# Patient Record
Sex: MALE | Race: WHITE
[De-identification: names, ages, dates, MRNs, and addresses within clinical notes are randomized per-mention and may not be internally consistent; named-entity substitution may affect disease eponyms.]

---

## 2022-11-03 LAB
BUN BLD-MCNC: 16 MG/DL (ref 7–18)
GLUCOSE SERPLBLD-MCNC: 122 MG/DL (ref 74–106)
HCT VFR BLD CALC: 45.6 % (ref 39.6–49)
INR BLD: 1.12
LYMPHOCYTES # SPEC AUTO: 1.4 K/UL (ref 0.7–4.9)
MCV RBC: 88.8 FL (ref 80–100)
PMV BLD: 7.3 FL (ref 7.6–11.3)
POTASSIUM SERPL-SCNC: 3.4 MMOL/L (ref 3.5–5.1)
RBC # BLD: 5.14 M/UL (ref 4.33–5.43)

## 2022-11-07 NOTE — EKG
Test Date:    2022-11-03               Test Time:    14:52:17

Technician:   NOEL                                     

                                                     

MEASUREMENT RESULTS:                                       

Intervals:                                           

Rate:         78                                     

MN:           142                                    

QRSD:         118                                    

QT:           358                                    

QTc:          408                                    

Axis:                                                

P:            70                                     

MN:           142                                    

QRS:          63                                     

T:            55                                     

                                                     

INTERPRETIVE STATEMENTS:                                       

                                                     

Normal sinus rhythm

Right bundle branch block

Abnormal ECG

No previous ECG available for comparison



Electronically Signed On 11-07-22 12:14:06 CST by Shorty Merida

## 2022-11-08 ENCOUNTER — HOSPITAL ENCOUNTER (OUTPATIENT)
Dept: HOSPITAL 97 - OR | Age: 57
Discharge: HOME | End: 2022-11-08
Attending: UROLOGY
Payer: SELF-PAY

## 2022-11-08 VITALS — DIASTOLIC BLOOD PRESSURE: 84 MMHG | SYSTOLIC BLOOD PRESSURE: 131 MMHG | OXYGEN SATURATION: 99 % | TEMPERATURE: 97 F

## 2022-11-08 DIAGNOSIS — E78.00: ICD-10-CM

## 2022-11-08 DIAGNOSIS — Z88.2: ICD-10-CM

## 2022-11-08 DIAGNOSIS — K21.9: ICD-10-CM

## 2022-11-08 DIAGNOSIS — Z88.1: ICD-10-CM

## 2022-11-08 DIAGNOSIS — N40.1: Primary | ICD-10-CM

## 2022-11-08 PROCEDURE — 80048 BASIC METABOLIC PNL TOTAL CA: CPT

## 2022-11-08 PROCEDURE — 93005 ELECTROCARDIOGRAM TRACING: CPT

## 2022-11-08 PROCEDURE — 0T7D8DZ DILATION OF URETHRA WITH INTRALUMINAL DEVICE, VIA NATURAL OR ARTIFICIAL OPENING ENDOSCOPIC: ICD-10-PCS

## 2022-11-08 PROCEDURE — 36415 COLL VENOUS BLD VENIPUNCTURE: CPT

## 2022-11-08 PROCEDURE — 87088 URINE BACTERIA CULTURE: CPT

## 2022-11-08 PROCEDURE — 85610 PROTHROMBIN TIME: CPT

## 2022-11-08 PROCEDURE — 87086 URINE CULTURE/COLONY COUNT: CPT

## 2022-11-08 PROCEDURE — 85025 COMPLETE CBC W/AUTO DIFF WBC: CPT

## 2022-11-08 RX ADMIN — HYDROMORPHONE HYDROCHLORIDE ONE MG: 1 INJECTION, SOLUTION INTRAMUSCULAR; INTRAVENOUS; SUBCUTANEOUS at 14:41

## 2022-11-08 RX ADMIN — HYDROMORPHONE HYDROCHLORIDE ONE MG: 1 INJECTION, SOLUTION INTRAMUSCULAR; INTRAVENOUS; SUBCUTANEOUS at 14:50

## 2022-11-08 NOTE — OP
Surgeon:  ALEXUS LAZAR



Preoperative Diagnosis:  Benign prostatic hypertrophy with lower urinary tract obstruction.



Postoperative Diagnosis:  Benign prostatic hypertrophy with lower urinary tract obstruction.



Principal Procedure:  

1.Prostatic urethral lift/UroLift.

2.Six implants placed with the UL2 device.

3.One implant placed with the ATC/advanced tissue control device.

4.Seven implants total implanted.



Indication For Procedure:  Mr. Soria [facility] with obstructive urinary symptom
s secondary to BPH.  Despite maximal medical management with alpha-blocker therapy, he still had pers
istent moderate LUTS.  After evaluation revealed the presence of an elevated median bar with median l
obe extending from the right bladder neck with association with the right lateral lobe, surgical ther
apy was discussed and the UroLift was elected.



Procedure In Detail:  The patient was consented in the preoperative holding area before being transfe
rred to the operative suite where general anesthesia was induced.  He was given Ancef 2 g IV antimicr
obial prophylaxis and pneumo boots were provided for DVT prophylaxis.  He was placed in the lithotomy
 position, padded and secured to the table appropriately and his genitalia were prepped with Hibiclen
s before he was draped in standard fashion.  The case was begun using a the 20-Wolof UroLift scope t
o traverse the urethra.  However, before this could be performed, because of significant meatal steno
sis, dilation of the meatus and fossa navicularis was performed.  Using urethral sounds beginning at 
18-Wolof, dense meatal stenosis was dilated all the way to 28-Wolof successfully.  I was then able 
to pass the cystoscope via the urethra into his bladder.  As had been previously observed, there was 
significant lateral lobar hypertrophy with a prostatic urethral length approximately 4-5 cm and an in
travesical component of the median lobe extending from the right lateral lobe of the prostate.  I thu
s began by targeting the left lateral lobe anteriorly and using a UroLift delivery device with the UL
2 device and anterolateral position approximately 1.5-2 cm distal to the bladder neck was identified.
  Approximately 10 degrees of compression was performed in that region and the trigger was pulled, th
ereby deploying a needle containing the implant through the prostate.  Further retraction of the tiss
ue was performed and the second pull was performed allowing 1 end of the implant to be delivered to t
he capsular surface of the prostate.  Additional tensioning was then performed before the third pull 
was performed further tensioning the tissue and then the scope was angled back toward the midline unt
il the monofilament was noted to be seated within the delivery bay before the fourth pole of the trig
matteo cut the monofilament affixing the endpiece, which then nicely compressed the lateral lobe in that
 position.  I then turned my attention to second implant, which was placed on the contralateral side 
of the right lateral lobe anteriorly.  This did create a nice anterior channel, which was then observ
ed using the visual obturator; however, significant tissue existed within the mid and apical zones of
 the prostate.  As a result, 2 additional implants were elected to be placed at the level of the veru
montanum, first on the left and then on the right anteriorly, which did create a nice anterior channe
l lateralizing the tissue throughout the length of the prostate.  However, there was still significan
t intraluminal projection of the median lobe extending from the right lateral lobe at the bladder nec
k.  So at this point, I utilized the advanced tissue control device to rasp and bring the median lobe
 back within the prostatic urethra before deploying the needle with a capsular tap through the prosta
te and appropriately seating it.  While this did compress a significant component of the median bar, 
there was still an intravesical component to the median lobe that remained obstructed.  As a result, 
I then utilized the UL2 device this time more proximally within the substance of the median lobe, but
 angled significantly laterally to deliver the medial needle all the way through the median lobe and 
through the lateral lobe outside the prostate.  This did successfully then lateralize the median lobe
 and create a nice open channel.  There was a slight residual amount of anterolateral overhang from t
he left side, which was then targeted the final seventh implant using the UL2 device on the left side
 more anteriorly and stacked approximately to the previously placed bladder neck implant on the left 
side.  Once complete, there was a nice anterior channel that had been created that was patent even wi
th the fluid turned off, so I left his bladder full and removed the scope placing an 18-Wolof cathet
er successfully into his bladder with 15 cc of sterile water in the balloon.  I then irrigated the ca
theter to ensure no clots, and the patient was taken out of the lithotomy position.  He was then awak
ened from general anesthesia before being transferred to a stretcher and then transferred to the kerry
very room in good condition.



Complications:  None.



Discharge Disposition:  He will be given a voiding trial in recovery and if successfully able to void
, he will be discharged home without the catheter.  Should he be unable to void, he will be discharge
d with a catheter and instructed on how to remove it at home tomorrow morning at 7 a.m.  Subsequent f
ollowup should be established in about 1 month's time to assess his current symptoms.





WR/MODL

DD:  11/08/2022 14:46:42Voice ID:  [id number]

DT:  11/08/2022 19:46:45Report ID:  [id number]